# Patient Record
Sex: FEMALE | Race: WHITE | NOT HISPANIC OR LATINO | Employment: OTHER | URBAN - METROPOLITAN AREA
[De-identification: names, ages, dates, MRNs, and addresses within clinical notes are randomized per-mention and may not be internally consistent; named-entity substitution may affect disease eponyms.]

---

## 2017-01-04 ENCOUNTER — TELEPHONE (OUTPATIENT)
Dept: RADIOLOGY | Facility: HOSPITAL | Age: 72
End: 2017-01-04

## 2017-01-04 ENCOUNTER — HOSPITAL ENCOUNTER (OUTPATIENT)
Dept: RADIOLOGY | Facility: HOSPITAL | Age: 72
Discharge: HOME | End: 2017-01-04
Attending: INTERNAL MEDICINE
Payer: MEDICARE

## 2017-01-04 DIAGNOSIS — G89.29 CHRONIC PAIN OF RIGHT KNEE: ICD-10-CM

## 2017-01-04 DIAGNOSIS — M25.561 CHRONIC PAIN OF RIGHT KNEE: ICD-10-CM

## 2017-01-04 PROCEDURE — 73721 MRI JNT OF LWR EXTRE W/O DYE: CPT | Mod: TC,PO,RT

## 2017-01-04 PROCEDURE — 73721 MRI JNT OF LWR EXTRE W/O DYE: CPT | Mod: 26,RT,, | Performed by: RADIOLOGY

## 2018-07-18 ENCOUNTER — HOSPITAL ENCOUNTER (OUTPATIENT)
Dept: RADIOLOGY | Facility: HOSPITAL | Age: 73
Discharge: HOME | End: 2018-07-18
Attending: INTERNAL MEDICINE
Payer: MEDICARE

## 2018-07-18 DIAGNOSIS — R29.898 WEAKNESS OF BOTH LOWER EXTREMITIES: ICD-10-CM

## 2018-07-18 PROCEDURE — 72148 MRI LUMBAR SPINE W/O DYE: CPT | Mod: TC,PO

## 2018-07-18 PROCEDURE — 72148 MRI LUMBAR SPINE W/O DYE: CPT | Mod: 26,,, | Performed by: RADIOLOGY

## 2019-10-03 ENCOUNTER — HOSPITAL ENCOUNTER (OUTPATIENT)
Dept: RADIOLOGY | Facility: HOSPITAL | Age: 74
Discharge: HOME | End: 2019-10-03
Attending: INTERNAL MEDICINE
Payer: MEDICARE

## 2019-10-03 DIAGNOSIS — Z87.891 PERSONAL HISTORY OF TOBACCO USE, PRESENTING HAZARDS TO HEALTH: ICD-10-CM

## 2019-10-03 PROCEDURE — G0297 LDCT FOR LUNG CA SCREEN: HCPCS | Mod: TC,PO

## 2019-10-03 PROCEDURE — G0297 CT CHEST LUNG SCREENING LOW DOSE: ICD-10-PCS | Mod: 26,,, | Performed by: RADIOLOGY

## 2019-10-03 PROCEDURE — G0297 LDCT FOR LUNG CA SCREEN: HCPCS | Mod: 26,,, | Performed by: RADIOLOGY

## 2021-08-16 ENCOUNTER — HOSPITAL ENCOUNTER (OUTPATIENT)
Dept: RADIOLOGY | Facility: HOSPITAL | Age: 76
Discharge: HOME | End: 2021-08-16
Attending: INTERNAL MEDICINE
Payer: MEDICARE

## 2021-08-16 VITALS — BODY MASS INDEX: 30.12 KG/M2 | HEIGHT: 63 IN | WEIGHT: 170 LBS

## 2021-08-16 DIAGNOSIS — Z12.31 ENCOUNTER FOR SCREENING MAMMOGRAM FOR MALIGNANT NEOPLASM OF BREAST: ICD-10-CM

## 2021-08-16 PROCEDURE — 77063 BREAST TOMOSYNTHESIS BI: CPT | Mod: 26,,, | Performed by: RADIOLOGY

## 2021-08-16 PROCEDURE — 77067 SCR MAMMO BI INCL CAD: CPT | Mod: 26,,, | Performed by: RADIOLOGY

## 2021-08-16 PROCEDURE — 77063 MAMMO DIGITAL SCREENING BILAT WITH TOMO: ICD-10-PCS | Mod: 26,,, | Performed by: RADIOLOGY

## 2021-08-16 PROCEDURE — 77067 SCR MAMMO BI INCL CAD: CPT | Mod: TC,PO

## 2021-08-16 PROCEDURE — 77067 MAMMO DIGITAL SCREENING BILAT WITH TOMO: ICD-10-PCS | Mod: 26,,, | Performed by: RADIOLOGY

## 2022-06-07 ENCOUNTER — HOSPITAL ENCOUNTER (EMERGENCY)
Facility: HOSPITAL | Age: 77
Discharge: HOME | End: 2022-06-07
Attending: EMERGENCY MEDICINE
Payer: MEDICARE

## 2022-06-07 VITALS
BODY MASS INDEX: 25.75 KG/M2 | TEMPERATURE: 98 F | HEART RATE: 75 BPM | DIASTOLIC BLOOD PRESSURE: 79 MMHG | RESPIRATION RATE: 16 BRPM | SYSTOLIC BLOOD PRESSURE: 143 MMHG | HEIGHT: 66 IN | OXYGEN SATURATION: 96 % | WEIGHT: 160.25 LBS

## 2022-06-07 DIAGNOSIS — M54.30 SCIATICA, UNSPECIFIED LATERALITY: Primary | ICD-10-CM

## 2022-06-07 PROCEDURE — 99283 EMERGENCY DEPT VISIT LOW MDM: CPT | Mod: ER

## 2022-06-07 RX ORDER — PREDNISONE 50 MG/1
50 TABLET ORAL DAILY
Qty: 5 TABLET | Refills: 0 | Status: SHIPPED | OUTPATIENT
Start: 2022-06-07 | End: 2022-06-12

## 2022-06-07 NOTE — ED PROVIDER NOTES
"Encounter Date: 6/7/2022       History     Chief Complaint   Patient presents with    Hip Pain     R hip pain radiating down R leg for 3 days. Denies any recent injury      77-year-old female with complaint of right-sided buttock pain with radiation right leg for the past 2-3 days.  Patient reports pain began after she was pulling weeds on her knees in her front yard.  Patient denies fall or trauma.  Patient denies loss of bowel or bladder function.  No fever chills.        Review of patient's allergies indicates:   Allergen Reactions    Novocain [procaine] Anaphylaxis    Penicillins Other (See Comments)     "blacked out"    Bactrim [sulfamethoxazole-trimethoprim]      History reviewed. No pertinent past medical history.  Past Surgical History:   Procedure Laterality Date    TOTAL HIP ARTHROPLASTY      right    TUBAL LIGATION       Family History   Problem Relation Age of Onset    Breast cancer Neg Hx     Colon cancer Neg Hx     Ovarian cancer Neg Hx     Thrombophilia Neg Hx      Social History     Tobacco Use    Smoking status: Former Smoker   Substance Use Topics    Alcohol use: No    Drug use: No     Review of Systems   Constitutional: Negative for fever.   HENT: Negative for sore throat.    Respiratory: Negative for shortness of breath.    Cardiovascular: Negative for chest pain.   Gastrointestinal: Negative for nausea.   Genitourinary: Negative for dysuria.   Musculoskeletal: Positive for back pain.   Skin: Negative for rash.   Neurological: Negative for weakness.   Hematological: Does not bruise/bleed easily.       Physical Exam     Initial Vitals [06/07/22 1609]   BP Pulse Resp Temp SpO2   (!) 143/79 75 16 97.8 °F (36.6 °C) 96 %      MAP       --         Physical Exam    Nursing note and vitals reviewed.  Constitutional: She appears well-developed and well-nourished.   HENT:   Head: Normocephalic and atraumatic.   Eyes: Conjunctivae and EOM are normal. Pupils are equal, round, and reactive to " light.   Neck: Neck supple.   Normal range of motion.  Cardiovascular: Normal rate, regular rhythm, normal heart sounds and intact distal pulses.   Pulmonary/Chest: Breath sounds normal.   Abdominal: Abdomen is soft. There is no abdominal tenderness. There is no rebound and no guarding.   Musculoskeletal:         General: Normal range of motion.      Cervical back: Normal range of motion and neck supple.      Comments: No spine tenderness, pain in right lower back and leg with right lateral bending of spine, able to walk on heels and toes, straight leg negative     Neurological: She is alert and oriented to person, place, and time. She has normal strength and normal reflexes.   Skin: Skin is warm and dry.   Psychiatric: She has a normal mood and affect. Her behavior is normal. Thought content normal.         ED Course   Procedures  Labs Reviewed - No data to display       Imaging Results    None          Medications - No data to display                       Clinical Impression:   Final diagnoses:  [M54.30] Sciatica, unspecified laterality (Primary)          ED Disposition Condition    Discharge Stable        ED Prescriptions     Medication Sig Dispense Start Date End Date Auth. Provider    predniSONE (DELTASONE) 50 MG Tab Take 1 tablet (50 mg total) by mouth once daily. for 5 days 5 tablet 6/7/2022 6/12/2022 Norbert Hare NP        Follow-up Information     Follow up With Specialties Details Why Contact Info    Jose May MD Internal Medicine Schedule an appointment as soon as possible for a visit  As needed [id number] [facility] [address]  [phone]             Norbert Hare NP  06/07/22 1708

## 2022-08-17 ENCOUNTER — HOSPITAL ENCOUNTER (OUTPATIENT)
Dept: RADIOLOGY | Facility: HOSPITAL | Age: 77
Discharge: HOME | End: 2022-08-17
Attending: INTERNAL MEDICINE
Payer: MEDICARE

## 2022-08-17 VITALS — BODY MASS INDEX: 25.71 KG/M2 | HEIGHT: 66 IN | WEIGHT: 160 LBS

## 2022-08-17 DIAGNOSIS — Z12.31 ENCOUNTER FOR SCREENING MAMMOGRAM FOR MALIGNANT NEOPLASM OF BREAST: ICD-10-CM

## 2022-08-17 PROCEDURE — 77067 SCR MAMMO BI INCL CAD: CPT | Mod: 26,,, | Performed by: RADIOLOGY

## 2022-08-17 PROCEDURE — 77063 BREAST TOMOSYNTHESIS BI: CPT | Mod: 26,,, | Performed by: RADIOLOGY

## 2022-08-17 PROCEDURE — 77063 BREAST TOMOSYNTHESIS BI: CPT | Mod: TC,PO

## 2022-08-17 PROCEDURE — 77063 MAMMO DIGITAL SCREENING BILAT WITH TOMO: ICD-10-PCS | Mod: 26,,, | Performed by: RADIOLOGY

## 2022-08-17 PROCEDURE — 77067 MAMMO DIGITAL SCREENING BILAT WITH TOMO: ICD-10-PCS | Mod: 26,,, | Performed by: RADIOLOGY

## 2022-08-17 PROCEDURE — 77067 SCR MAMMO BI INCL CAD: CPT | Mod: TC,PO

## 2023-05-16 ENCOUNTER — LAB VISIT (OUTPATIENT)
Dept: LAB | Facility: HOSPITAL | Age: 78
End: 2023-05-16
Attending: PODIATRIST
Payer: MEDICARE

## 2023-05-16 DIAGNOSIS — B35.1 ONYCHOMYCOSIS: ICD-10-CM

## 2023-05-16 LAB
ALBUMIN SERPL BCP-MCNC: 3.9 G/DL (ref 3.5–5.2)
ALP SERPL-CCNC: 102 U/L (ref 55–135)
ALT SERPL W/O P-5'-P-CCNC: 16 U/L (ref 10–44)
AST SERPL-CCNC: 10 U/L (ref 10–40)
BILIRUB DIRECT SERPL-MCNC: 0.1 MG/DL (ref 0.1–0.3)
BILIRUB SERPL-MCNC: 0.3 MG/DL (ref 0.1–1)
PROT SERPL-MCNC: 7.1 G/DL (ref 6–8.4)

## 2023-05-16 PROCEDURE — 80076 HEPATIC FUNCTION PANEL: CPT | Mod: PO | Performed by: PODIATRIST

## 2023-05-16 PROCEDURE — 36415 COLL VENOUS BLD VENIPUNCTURE: CPT | Mod: PO | Performed by: PODIATRIST

## 2023-09-05 DIAGNOSIS — Z12.31 ENCOUNTER FOR SCREENING MAMMOGRAM FOR BREAST CANCER: Primary | ICD-10-CM

## 2023-09-06 ENCOUNTER — HOSPITAL ENCOUNTER (OUTPATIENT)
Dept: RADIOLOGY | Facility: HOSPITAL | Age: 78
Discharge: HOME | End: 2023-09-06
Attending: NURSE PRACTITIONER
Payer: MEDICARE

## 2023-09-06 VITALS — WEIGHT: 160 LBS | BODY MASS INDEX: 25.71 KG/M2 | HEIGHT: 66 IN

## 2023-09-06 DIAGNOSIS — Z12.31 ENCOUNTER FOR SCREENING MAMMOGRAM FOR BREAST CANCER: ICD-10-CM

## 2023-09-06 PROCEDURE — 77067 SCR MAMMO BI INCL CAD: CPT | Mod: TC,PO

## 2023-09-06 PROCEDURE — 77067 SCR MAMMO BI INCL CAD: CPT | Mod: 26,,, | Performed by: RADIOLOGY

## 2023-09-06 PROCEDURE — 77063 BREAST TOMOSYNTHESIS BI: CPT | Mod: 26,,, | Performed by: RADIOLOGY

## 2023-09-06 PROCEDURE — 77067 MAMMO DIGITAL SCREENING BILAT WITH TOMO: ICD-10-PCS | Mod: 26,,, | Performed by: RADIOLOGY

## 2023-09-06 PROCEDURE — 77063 MAMMO DIGITAL SCREENING BILAT WITH TOMO: ICD-10-PCS | Mod: 26,,, | Performed by: RADIOLOGY

## 2024-01-10 ENCOUNTER — LAB VISIT (OUTPATIENT)
Dept: LAB | Facility: HOSPITAL | Age: 79
End: 2024-01-10
Attending: PODIATRIST
Payer: MEDICARE

## 2024-01-10 DIAGNOSIS — B35.1 ONYCHOMYCOSIS: ICD-10-CM

## 2024-01-10 LAB
ALBUMIN SERPL BCP-MCNC: 3.8 G/DL (ref 3.5–5.2)
ALP SERPL-CCNC: 114 U/L (ref 55–135)
ALT SERPL W/O P-5'-P-CCNC: 17 U/L (ref 10–44)
AST SERPL-CCNC: 14 U/L (ref 10–40)
BILIRUB DIRECT SERPL-MCNC: 0.1 MG/DL (ref 0.1–0.3)
BILIRUB SERPL-MCNC: 0.2 MG/DL (ref 0.1–1)
PROT SERPL-MCNC: 7.4 G/DL (ref 6–8.4)

## 2024-01-10 PROCEDURE — 80076 HEPATIC FUNCTION PANEL: CPT | Mod: PO | Performed by: PODIATRIST

## 2024-01-10 PROCEDURE — 36415 COLL VENOUS BLD VENIPUNCTURE: CPT | Mod: PO | Performed by: PODIATRIST

## 2024-01-18 ENCOUNTER — HOSPITAL ENCOUNTER (EMERGENCY)
Facility: HOSPITAL | Age: 79
Discharge: HOME | End: 2024-01-18
Attending: EMERGENCY MEDICINE
Payer: MEDICARE

## 2024-01-18 VITALS
WEIGHT: 157.63 LBS | HEART RATE: 76 BPM | OXYGEN SATURATION: 96 % | HEIGHT: 66 IN | BODY MASS INDEX: 25.33 KG/M2 | RESPIRATION RATE: 18 BRPM | DIASTOLIC BLOOD PRESSURE: 65 MMHG | TEMPERATURE: 98 F | SYSTOLIC BLOOD PRESSURE: 136 MMHG

## 2024-01-18 DIAGNOSIS — J06.9 VIRAL UPPER RESPIRATORY TRACT INFECTION WITH COUGH: Primary | ICD-10-CM

## 2024-01-18 LAB
CTP QC/QA: YES
CTP QC/QA: YES
POC MOLECULAR INFLUENZA A AGN: NEGATIVE
POC MOLECULAR INFLUENZA B AGN: NEGATIVE
SARS-COV-2 RDRP RESP QL NAA+PROBE: NEGATIVE

## 2024-01-18 PROCEDURE — 99282 EMERGENCY DEPT VISIT SF MDM: CPT | Mod: ER

## 2024-01-18 PROCEDURE — 87502 INFLUENZA DNA AMP PROBE: CPT | Mod: ER

## 2024-01-18 PROCEDURE — 87635 SARS-COV-2 COVID-19 AMP PRB: CPT | Mod: ER | Performed by: EMERGENCY MEDICINE

## 2024-01-18 NOTE — ED PROVIDER NOTES
"Encounter Date: 1/18/2024       History     Chief Complaint   Patient presents with    COVID-19 Concerns     Requesting Covid test due to cough, onset yesterday     Asking for a COVID test, minor cough since yesterday with slight hoarseness, no other symptoms.  Had COVID once a year ago, not hospitalized, denies having had any doses of vaccine.  No other symptoms presently, denies fever, dyspnea, vomiting, diarrhea, chest pain, or other complaints.  No other concerns.  Testing negative for flu and COVID on arrival, recommended routine over-the-counter cough medicine, she does not want any further evaluation today and has no other complaints.    The history is provided by the patient. No  was used.     Review of patient's allergies indicates:   Allergen Reactions    Novocain [procaine] Anaphylaxis    Penicillins Other (See Comments)     "blacked out"    Bactrim [sulfamethoxazole-trimethoprim]      History reviewed. No pertinent past medical history.  Past Surgical History:   Procedure Laterality Date    TOTAL HIP ARTHROPLASTY      right    TUBAL LIGATION       Family History   Problem Relation Age of Onset    Breast cancer Neg Hx     Colon cancer Neg Hx     Ovarian cancer Neg Hx     Thrombophilia Neg Hx      Social History     Tobacco Use    Smoking status: Former     Types: Cigarettes    Smokeless tobacco: Current   Substance Use Topics    Alcohol use: No    Drug use: No     Review of Systems   Constitutional:  Negative for activity change, fatigue and fever.   HENT:  Negative for congestion, ear pain, facial swelling, nosebleeds, sinus pressure and sore throat.    Eyes:  Negative for pain, discharge, redness and visual disturbance.   Respiratory:  Positive for cough. Negative for choking, chest tightness, shortness of breath and wheezing.    Cardiovascular:  Negative for chest pain, palpitations and leg swelling.   Gastrointestinal:  Negative for abdominal distention, abdominal pain, nausea and " vomiting.   Endocrine: Negative for heat intolerance, polydipsia and polyuria.   Genitourinary:  Negative for difficulty urinating, dysuria, flank pain, hematuria and urgency.   Musculoskeletal:  Negative for back pain, gait problem, joint swelling and myalgias.   Skin:  Negative for color change and rash.   Allergic/Immunologic: Negative for environmental allergies and food allergies.   Neurological:  Negative for dizziness, weakness, numbness and headaches.   Hematological:  Negative for adenopathy. Does not bruise/bleed easily.   Psychiatric/Behavioral:  Negative for agitation and behavioral problems. The patient is not nervous/anxious.    All other systems reviewed and are negative.      Physical Exam     Initial Vitals [01/18/24 0741]   BP Pulse Resp Temp SpO2   136/65 76 18 98.1 °F (36.7 °C) 96 %      MAP       --         Physical Exam    Nursing note and vitals reviewed.  Constitutional: She appears well-developed and well-nourished. She is not diaphoretic. No distress.   HENT:   Head: Normocephalic and atraumatic.   Mouth/Throat: No oropharyngeal exudate.   Eyes: Conjunctivae and EOM are normal. Pupils are equal, round, and reactive to light. Right eye exhibits no discharge. Left eye exhibits no discharge. No scleral icterus.   Neck: Neck supple. No thyromegaly present. No tracheal deviation present. No JVD present.   Normal range of motion.  Cardiovascular:  Normal rate, regular rhythm, normal heart sounds and intact distal pulses.     Exam reveals no gallop and no friction rub.       No murmur heard.  Pulmonary/Chest: Breath sounds normal. No stridor. No respiratory distress. She has no wheezes. She has no rhonchi. She has no rales. She exhibits no tenderness.   Abdominal: Abdomen is soft. Bowel sounds are normal. She exhibits no distension and no mass. There is no abdominal tenderness. There is no rebound and no guarding.   Musculoskeletal:         General: No tenderness or edema. Normal range of motion.       Cervical back: Normal range of motion and neck supple.     Neurological: She is alert and oriented to person, place, and time. She has normal strength.   Skin: Skin is warm and dry. No rash and no abscess noted. No erythema.   Psychiatric: She has a normal mood and affect. Her behavior is normal. Judgment and thought content normal.         ED Course   Procedures  Labs Reviewed   SARS-COV-2 RDRP GENE   POCT INFLUENZA A/B MOLECULAR          Imaging Results    None          Medications - No data to display  Medical Decision Making  Problems Addressed:  Viral upper respiratory tract infection with cough: acute illness or injury    Amount and/or Complexity of Data Reviewed  Labs: ordered. Decision-making details documented in ED Course.    Risk  OTC drugs.      Additional MDM:   Differential Diagnosis:   COVID/ flu/ other viral URI                                    Clinical Impression:  Final diagnoses:  [J06.9] Viral upper respiratory tract infection with cough (Primary)          ED Disposition Condition    Discharge Stable          ED Prescriptions    None       Follow-up Information       Follow up With Specialties Details Why Contact Info    [facility] Emergency Dept Emergency Medicine  As needed [address] [address] [address] [phone]    Ming Hyman Jr., DPM Podiatry  As needed [address]  [address]  [facility]  [facility] [id number] [phone]               Giovani Ruano MD  01/18/24 6239

## 2024-02-09 ENCOUNTER — HOSPITAL ENCOUNTER (EMERGENCY)
Facility: HOSPITAL | Age: 79
Discharge: HOME | End: 2024-02-09
Attending: EMERGENCY MEDICINE
Payer: MEDICARE

## 2024-02-09 VITALS
WEIGHT: 157.88 LBS | RESPIRATION RATE: 18 BRPM | DIASTOLIC BLOOD PRESSURE: 67 MMHG | OXYGEN SATURATION: 96 % | TEMPERATURE: 98 F | SYSTOLIC BLOOD PRESSURE: 154 MMHG | HEART RATE: 74 BPM | BODY MASS INDEX: 25.48 KG/M2

## 2024-02-09 DIAGNOSIS — M25.552 BILATERAL HIP PAIN: ICD-10-CM

## 2024-02-09 DIAGNOSIS — M25.551 BILATERAL HIP PAIN: ICD-10-CM
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
## 2024-02-09 PROCEDURE — 99283 EMERGENCY DEPT VISIT LOW MDM: CPT | Mod: 25,ER

## 2024-02-09 PROCEDURE — 25000003 PHARM REV CODE 250: Mod: ER | Performed by: NURSE PRACTITIONER

## 2024-02-09 RX ORDER — MUPIROCIN 20 MG/G
OINTMENT TOPICAL
Status: COMPLETED | OUTPATIENT
Start: 2024-02-09 | End: 2024-02-09

## 2024-02-09 RX ADMIN — MUPIROCIN: 20 OINTMENT TOPICAL at 01:02

## 2024-02-09 NOTE — ED PROVIDER NOTES
"Encounter Date: 2/9/2024       History     Chief Complaint   Patient presents with    Fall     Fell on Wednesday. ground level fall. Pain in hips. Bruising in right hip. Hx of hip replacements.      The complaints are hip pain after a fall which occurred 2 days ago. She has a wound on her right hip to which she has applied neosporin and gauze. No difficulty walking but concerned about her hip replacements.         Review of patient's allergies indicates:   Allergen Reactions    Novocain [procaine] Anaphylaxis    Penicillins Other (See Comments)     "blacked out"    Bactrim [sulfamethoxazole-trimethoprim]      No past medical history on file.  Past Surgical History:   Procedure Laterality Date    TOTAL HIP ARTHROPLASTY      right    TUBAL LIGATION       Family History   Problem Relation Age of Onset    Breast cancer Neg Hx     Colon cancer Neg Hx     Ovarian cancer Neg Hx     Thrombophilia Neg Hx      Social History     Tobacco Use    Smoking status: Former     Types: Cigarettes    Smokeless tobacco: Current   Substance Use Topics    Alcohol use: No    Drug use: No     Review of Systems   Constitutional:  Negative for chills and fever.   Respiratory:  Negative for shortness of breath.    Cardiovascular:  Negative for chest pain.   Gastrointestinal:  Negative for nausea and vomiting.   Musculoskeletal:  Negative for back pain, gait problem and joint swelling.   Skin:  Positive for wound.   Psychiatric/Behavioral:  Negative for agitation and behavioral problems.        Physical Exam     Initial Vitals [02/09/24 1248]   BP Pulse Resp Temp SpO2   (!) 154/67 74 18 98.1 °F (36.7 °C) 96 %      MAP       --         Physical Exam    Vitals reviewed.  Constitutional: She appears well-developed and well-nourished.   HENT:   Head: Normocephalic and atraumatic.   Eyes: Conjunctivae are normal.   Neck:   Normal range of motion.  Cardiovascular:  Normal rate and regular rhythm.           Pulmonary/Chest: Breath sounds normal. No " respiratory distress.   Abdominal: Abdomen is soft. Bowel sounds are normal.   Musculoskeletal:         General: Normal range of motion.      Cervical back: Normal range of motion.      Right hip: No deformity or bony tenderness. Normal range of motion.      Left hip: No deformity or bony tenderness. Normal range of motion.     Neurological: She is oriented to person, place, and time.   Skin:   Strawberry type wound covering right hip.    Psychiatric: She has a normal mood and affect. Thought content normal.         ED Course   Procedures  Labs Reviewed - No data to display       Imaging Results              X-Ray Hips Bilateral 2 View Inc AP Pelvis (Final result)  Result time 02/09/24 13:56:10   Procedure changed from X-Ray Hip 2 or 3 views Right (with Pelvis when performed)     Final result by Sav Reyna MD (02/09/24 13:56:10)                   Impression:      Patient is status post bilateral hip replacement.  No acute osseous abnormality is identified      Electronically signed by: Sav Reyna  Date:    02/09/2024  Time:    13:56               Narrative:    EXAMINATION:  XR HIPS BILATERAL 2 VIEW INCL AP PELVIS    CLINICAL HISTORY:  Unspecified fall, initial encounterpain;    TECHNIQUE:  AP pelvis with two views of both hips    COMPARISON:  None    FINDINGS:  The patient is status post bilateral hip replacement.  No acute fracture or dislocation is identified.  Arthritic changes of involving the visualized lower spine and SI joints.  There is a pessary ring in the pelvis.                                       Medications   mupirocin 2 % ointment ( Topical (Top) Given 2/9/24 2375)     Medical Decision Making  Amount and/or Complexity of Data Reviewed  Radiology: ordered.    Risk  Prescription drug management.                           The pt is resting comfortably and is NAD. . Discussed test results, shared treatment plan, specific conditions for return, and the need for f/u.  Answered their questions at this  time.  Pt understands and agrees to the plan.  The pt has remained hemodynamically stable through ED course and is stable for discharge.     I discussed with patient and/or family/caretaker that evaluation in the ED does not suggest any emergent or life threatening medical conditions requiring immediate intervention beyond what was provided in the ED, and I believe patient is safe for discharge.  Regardless, an unremarkable evaluation in the ED does not preclude the development or presence of a serious of life threatening condition. As such, patient was instructed to return immediately for any worsening or change in current symptoms.            Clinical Impression:  Final diagnoses:  [W19.XXXA] Fall  [W19.XXXA] Fall, initial encounter (Primary)  [M25.551, M25.552] Bilateral hip pain          ED Disposition Condition    Discharge Stable          ED Prescriptions    None       Follow-up Information       Follow up With Specialties Details Why Contact Info    Ming Hyman Jr., DPM Podiatry  As needed [address]  [address]  [facility]  [facility] [address]  [phone]               Nicki Ramachandran, P  02/09/24 5758

## 2024-05-16 ENCOUNTER — TELEPHONE (OUTPATIENT)
Dept: OBSTETRICS AND GYNECOLOGY | Facility: CLINIC | Age: 79
End: 2024-05-16
Payer: MEDICARE

## 2024-05-16 NOTE — TELEPHONE ENCOUNTER
----- Message from Tamia Taylor sent at 5/16/2024  1:00 PM CDT -----  Contact: Yaquelin Jamison is needing a call back regarding being scheduled in [facility] for a new patient appointment for vaginal bleeding. Please give her a call at [phone]

## 2024-05-16 NOTE — TELEPHONE ENCOUNTER
----- Message from Larisa Andrews sent at 5/16/2024  3:04 PM CDT -----  Contact: Yaquelin  Type:  Patient Returning Call    Who Called:Yaquelin  Who Left Message for Patient:unknown  Does the patient know what this is regarding?:Appointment scheduling  Would the patient rather a call back or a response via [facility]? call  Best Call Back Number:[phone]   Additional Information: Patient reports missing a call and request another call back to schedule for an appointment on Monday.   Thank you,  GH

## 2024-05-16 NOTE — TELEPHONE ENCOUNTER
Pt called in regards to vaginal bleeding, Pt wanted to be seen in [facility] only. Advised pt that we do not have GYN in [facility] at this time. Offered pt [facility]. Pt declined.       Olga MCMILLAN LPN  OB/GYN

## 2024-10-11 ENCOUNTER — TELEPHONE (OUTPATIENT)
Dept: OBSTETRICS AND GYNECOLOGY | Facility: CLINIC | Age: 79
End: 2024-10-11
Payer: MEDICARE

## 2024-10-11 DIAGNOSIS — Z12.31 ENCOUNTER FOR SCREENING MAMMOGRAM FOR BREAST CANCER: Primary | ICD-10-CM

## 2024-10-11 NOTE — TELEPHONE ENCOUNTER
Called to assist with scheduling mammogram, left message for patient to call office to schedule mammogram.

## 2024-10-11 NOTE — TELEPHONE ENCOUNTER
----- Message from Alda sent at 10/10/2024  4:15 PM CDT -----  Caller is requesting to schedule their annual mammogram appointment.  Order is not listed in EPIC.  Please enter order and contact patient to schedule.    Name of Caller:NORY ADKINS [[id number]]      Where would they like the mammogram performed? [facility]      Best Call Back Number:Telephone Information:  Mobile          [phone]      Additional Information:Please advise thank you

## 2024-10-16 ENCOUNTER — HOSPITAL ENCOUNTER (OUTPATIENT)
Dept: RADIOLOGY | Facility: HOSPITAL | Age: 79
Discharge: HOME | End: 2024-10-16
Attending: NURSE PRACTITIONER
Payer: MEDICARE

## 2024-10-16 VITALS — HEIGHT: 66 IN | WEIGHT: 153 LBS | BODY MASS INDEX: 24.59 KG/M2

## 2024-10-16 DIAGNOSIS — Z12.31 ENCOUNTER FOR SCREENING MAMMOGRAM FOR BREAST CANCER: ICD-10-CM

## 2024-10-16 PROCEDURE — 77067 SCR MAMMO BI INCL CAD: CPT | Mod: TC,PO

## 2024-10-16 PROCEDURE — 77067 SCR MAMMO BI INCL CAD: CPT | Mod: 26,,, | Performed by: STUDENT IN AN ORGANIZED HEALTH CARE EDUCATION/TRAINING PROGRAM

## 2024-10-16 PROCEDURE — 77063 BREAST TOMOSYNTHESIS BI: CPT | Mod: 26,,, | Performed by: STUDENT IN AN ORGANIZED HEALTH CARE EDUCATION/TRAINING PROGRAM

## 2024-10-18 ENCOUNTER — TELEPHONE (OUTPATIENT)
Dept: OBSTETRICS AND GYNECOLOGY | Facility: CLINIC | Age: 79
End: 2024-10-18
Payer: MEDICARE

## 2024-10-18 NOTE — TELEPHONE ENCOUNTER
----- Message from Sophy Baxter NP sent at 10/18/2024  9:32 AM CDT -----  Your mammogram has been interrupted as normal, recommend to repeat in one year.

## 2024-10-18 NOTE — TELEPHONE ENCOUNTER
----- Message from Ирина sent at 10/18/2024 12:15 PM CDT -----  Type:  Patient Returning Call    Who Called:Yaquelin Diaz    Who Left Message for Patient:Carissa   Does the patient know what this is regarding?:mammo  Would the patient rather a call back or a response via [facility]?    Best Call Back Number:[phone]    Additional Information: RTC

## 2025-03-04 DIAGNOSIS — M25.562 LEFT KNEE PAIN, UNSPECIFIED CHRONICITY: Primary | ICD-10-CM

## 2025-03-05 ENCOUNTER — HOSPITAL ENCOUNTER (OUTPATIENT)
Dept: RADIOLOGY | Facility: HOSPITAL | Age: 80
Discharge: HOME | End: 2025-03-05
Attending: ORTHOPAEDIC SURGERY
Payer: MEDICARE

## 2025-03-05 ENCOUNTER — OFFICE VISIT (OUTPATIENT)
Dept: ORTHOPEDICS | Facility: CLINIC | Age: 80
End: 2025-03-05
Payer: MEDICARE

## 2025-03-05 ENCOUNTER — TELEPHONE (OUTPATIENT)
Dept: ORTHOPEDICS | Facility: CLINIC | Age: 80
End: 2025-03-05
Payer: MEDICARE

## 2025-03-05 VITALS — HEIGHT: 64 IN | WEIGHT: 160.69 LBS | BODY MASS INDEX: 27.43 KG/M2

## 2025-03-05 DIAGNOSIS — Z96.643 STATUS POST HIP REPLACEMENT, BILATERAL: ICD-10-CM

## 2025-03-05 DIAGNOSIS — Z96.651 STATUS POST RIGHT KNEE REPLACEMENT: Primary | ICD-10-CM

## 2025-03-05 DIAGNOSIS — M17.12 PRIMARY OSTEOARTHRITIS OF LEFT KNEE: ICD-10-CM

## 2025-03-05 DIAGNOSIS — M25.562 LEFT KNEE PAIN, UNSPECIFIED CHRONICITY: ICD-10-CM

## 2025-03-05 DIAGNOSIS — M25.562 LEFT KNEE PAIN, UNSPECIFIED CHRONICITY: Primary | ICD-10-CM

## 2025-03-05 PROCEDURE — 99203 OFFICE O/P NEW LOW 30 MIN: CPT | Mod: S$PBB,,, | Performed by: ORTHOPAEDIC SURGERY

## 2025-03-05 PROCEDURE — 73562 X-RAY EXAM OF KNEE 3: CPT | Mod: 26,59,RT, | Performed by: RADIOLOGY

## 2025-03-05 PROCEDURE — 99999 PR PBB SHADOW E&M-EST. PATIENT-LVL III: CPT | Mod: PBBFAC,,, | Performed by: ORTHOPAEDIC SURGERY

## 2025-03-05 PROCEDURE — G2211 COMPLEX E/M VISIT ADD ON: HCPCS | Mod: S$PBB,,, | Performed by: ORTHOPAEDIC SURGERY

## 2025-03-05 PROCEDURE — 73564 X-RAY EXAM KNEE 4 OR MORE: CPT | Mod: 26,LT,, | Performed by: RADIOLOGY

## 2025-03-05 PROCEDURE — 99213 OFFICE O/P EST LOW 20 MIN: CPT | Mod: PBBFAC,25 | Performed by: ORTHOPAEDIC SURGERY

## 2025-03-05 PROCEDURE — 73562 X-RAY EXAM OF KNEE 3: CPT | Mod: TC,RT

## 2025-03-05 RX ORDER — SYRING-NEEDL,DISP,INSUL,0.3 ML 29 G X1/2"
296 SYRINGE, EMPTY DISPOSABLE MISCELLANEOUS ONCE
COMMUNITY

## 2025-03-05 RX ORDER — VITAMIN E 268 MG
1 CAPSULE ORAL EVERY MORNING
COMMUNITY

## 2025-03-05 RX ORDER — AMLODIPINE BESYLATE 10 MG/1
1 TABLET ORAL EVERY MORNING
COMMUNITY
Start: 2024-12-30 | End: 2025-12-30

## 2025-03-05 RX ORDER — VITAMIN E/VITAMINS A AND D
CREAM (GRAM) TOPICAL
COMMUNITY

## 2025-03-05 RX ORDER — ASCORBIC ACID 500 MG
1 TABLET ORAL EVERY MORNING
COMMUNITY

## 2025-03-05 RX ORDER — GARLIC 1000 MG
CAPSULE ORAL
COMMUNITY

## 2025-03-05 RX ORDER — FOLIC ACID 0.4 MG
1 TABLET ORAL EVERY MORNING
COMMUNITY

## 2025-03-05 RX ORDER — MELOXICAM 15 MG/1
1 TABLET ORAL DAILY
COMMUNITY
Start: 2024-03-20

## 2025-03-05 NOTE — TELEPHONE ENCOUNTER
Patient called has been returned regarding gel injection. She would like to come on \03/19/25 at 9:45

## 2025-03-05 NOTE — PATIENT INSTRUCTIONS
Encounter Diagnoses   Name Primary?    Status post right knee replacement Yes    Primary osteoarthritis of left knee     Status post hip replacement, bilateral        ASSESSMENT:  Kellgren Marquez grade 4 arthritis of the left knee.  Right total knee arthroplasty 5+ years ago per Dr. Kenny  Bilateral total hip arthroplasties by Dr. Kenny    TREATMENT/PLAN:  Prescription for a rolling walker with seat.  Handicap parking application permanent disability.  Left knee viscosupplementation, single injection product.  Pre cure precertification and return to office in 2 weeks for injectional treatment

## 2025-03-05 NOTE — PROGRESS NOTES
Paco Darling MD  Orthopedic Surgeon   [address] [place] [address]                 Name: Yaquelin Diaz  MRN: [id number]  Date: 3/5/2025    Patient ID: Yaquelin Diaz is a 79 y.o. female from [place]    Reason for visit:  Evaluation of left knee pain, the patient wants to get viscosupplementation for her left knee arthritis    Chief Complaint: Pain of the Left Knee      Patient Instructions     Encounter Diagnoses   Name Primary?    Status post right knee replacement Yes    Primary osteoarthritis of left knee     Status post hip replacement, bilateral        ASSESSMENT:  Kellgren Marquez grade 4 arthritis of the left knee.  Right total knee arthroplasty 5+ years ago per Dr. Kenny  Bilateral total hip arthroplasties by Dr. Kenny    TREATMENT/PLAN:  Prescription for a rolling walker with seat.  Handicap parking application permanent disability.  Left knee viscosupplementation, single injection product.  Pre cure precertification and return to office in 2 weeks for injectional treatment    HISTORY OF PRESENT ILLNESS:   Yaquelin Diaz is a 79 y.o. female.Patient presents today on referral from her friend and my patient Mrs. Gutierrez  She has not 8/10 pain in her left knee due to osteoarthritis.  Recently had a Visco supplement injection in June or July of 2024 that helped tremendously dropping her pain down to 3/10.  The Visco supplement injection was performed at the [facility] by Dr. Kenny.    She has bilateral total hip replacements and a right total knee replacement all performed by Dr. Kenny.  These are doing well.    She is establishing orthopedic care with me at this time, her previous orthopedic surgeon Dr. Kenny has retired.    Objective     XRAY:  03/05/2025 x-rays at  "[facility] today  Kellgren Marquez grade 4 arthritis of the left knee bone-on-bone with a varus deformity.  Right knee replacement with a rotating platform implant and patellar resurfacing.  Good alignment and positioning of the right total knee.    PHYSICAL EXAMINATION: Body mass index is 28.02 kg/m².    GENERAL:  Pleasant cooperative alert  female in no acute distress well dressed and well groomed.    EXTREMITY:  Left knee range of motion near 0° to 120+ degrees of flexion.  Skin intact around the left knee.  Positive crepitation medial joint line.  No popliteal cyst.  Calf soft.  Neurovascularly intact.  Normal pulses and cap refill.  Collateral ligaments stable.  ACL PCL stable.  Left hip and ankle range of motion are normal without pain.    Right total knee has a well-healed anterior incision flexing 0 to 120+ with good stability in extension, mid flexion and deep flexion.  Central patellofemoral tracking on the right knee without significant clicking popping or grinding.  Calf soft in the right lower extremity normal pulses and capillary refill.    Bilateral hips have good circumduction range of motion without significant pain.         MEDICATIONS: Current Medications[1]    ALLERGIES:   Review of patient's allergies indicates:   Allergen Reactions    Novocain [procaine] Anaphylaxis    Penicillins Other (See Comments)     "blacked out"    Bactrim [sulfamethoxazole-trimethoprim]        MEDICAL HISTORY:   Past Medical History:   Diagnosis Date    Arthritis     Hypertension        SURGICAL HISTORY:   Past Surgical History:   Procedure Laterality Date    KNEE SURGERY Right     TOTAL HIP ARTHROPLASTY Bilateral     right    TUBAL LIGATION         REVIEW OF SYSTEMS:   No fevers, chills, sweats, chest pain or shortness of breath.  Positive for back pain and joint pain.  SOCIAL HISTORY:   Social History     Occupational History    Not on file   Tobacco Use    Smoking status: Former     Types: Cigarettes    " Smokeless tobacco: Never   Substance and Sexual Activity    Alcohol use: No    Drug use: No    Sexual activity: Not Currently          35 minute patient encounter  This includes face to face time and non-face to face time preparing to see the patient (eg, review of tests),   obtaining and/or reviewing separately obtained history, documenting clinical information in the electronic or other health record, independently interpreting results   and communicating results to the patient/family/caregiver, or care coordinator.       This patient has a chronic osteoarthritis diagnosis/condition.  Today's visit is associated with current or anticipated ongoing medical care related to this patient's diagnosis of osteoarthritis.   Currently there is no cure of osteoarthritis and the patient will require regular follow up to manage symptoms and progression.  I, Paco Darling M.D., will be the primary medical provider for ongoing episodic treatment every 3-6 months as needed.  As such, CPT code  is the appropriate add-on code to accompany the other E/M billing for this visit.       DISCLAIMER: This note was prepared with Kamel[employer] voice recognition transcription software. Garbled syntax, mangled pronouns, and other bizarre constructions may be attributed to that software system.      Paco Darling M.D.  Orthopedic Surgeon  [facility]           [1]   Current Outpatient Medications:     aloe vera 99.5 % Gel, Apply topically., Disp: , Rfl:     amLODIPine (NORVASC) 10 MG tablet, Take 1 tablet by mouth every morning., Disp: , Rfl:     ascorbic acid, vitamin C, (VITAMIN C) 500 MG tablet, Take 1 tablet by mouth every morning., Disp: , Rfl:     calcium carbonate 1250 MG capsule, Take 1,250 mg by mouth 2 (two) times daily with meals., Disp: , Rfl:     cyanocobalamin, vitamin B-12, (VITAMIN B-12) 50 mcg tablet, Take 50 mcg by mouth once daily., Disp: , Rfl:     D5W SolP 100 mL with folic acid 5 mg/mL Soln, Inject into  the vein once., Disp: , Rfl:     folic acid (FOLVITE) 400 MCG tablet, Take 1 tablet by mouth every morning., Disp: , Rfl:     garlic 1,000 mg Cap, Take by mouth., Disp: , Rfl:     magnesium citrate solution, Take 296 mLs by mouth once., Disp: , Rfl:     meloxicam (MOBIC) 15 MG tablet, Take 1 tablet by mouth once daily., Disp: , Rfl:     multivitamin with minerals tablet, Take 1 tablet by mouth once daily., Disp: , Rfl:     potassium bicarbonate (K-LYTE) disintegrating tablet, Take by mouth 2 (two) times daily., Disp: , Rfl:     VITAMIN B COMPLEX ORAL, Take 1 tablet by mouth every morning., Disp: , Rfl:     vitamin E 400 UNIT capsule, Take 1 capsule by mouth every morning., Disp: , Rfl:     conjugated estrogens (PREMARIN) vaginal cream, Place vaginally once daily. Place 1 gm vaginally 2-3 times a week x 2 months, Disp: 30 g, Rfl: 3

## 2025-03-05 NOTE — TELEPHONE ENCOUNTER
----- Message from Deanne sent at 3/5/2025  1:29 PM CST -----  Contact: NORY ADKINS [[id number]]  ..Type:  Patient Requesting CallWho Called: NORY ADKINS [[id number]]Does the patient know what this is regarding?: scheduling injectionWould the patient rather a call back or a response via [facility]?  callBest Call Back Number: .[phone] (home) Additional Information:

## 2025-03-05 NOTE — TELEPHONE ENCOUNTER
----- Message from Deanne sent at 3/5/2025  1:29 PM CST -----  Contact: NORY ADKINS [[id number]]  ..Type:  Patient Requesting CallWho Called: NORY ADKINS [[id number]]Does the patient know what this is regarding?: scheduling injectionWould the patient rather a call back or a response via [facility]?  callBest Call Back Number: .[phone] (home) Additional Information:

## 2025-03-06 ENCOUNTER — TELEPHONE (OUTPATIENT)
Dept: ORTHOPEDICS | Facility: CLINIC | Age: 80
End: 2025-03-06
Payer: MEDICARE

## 2025-03-06 NOTE — TELEPHONE ENCOUNTER
Spoke with pt, advised pt there are no sooner available appts, pt opted to keep original appt call ended well.

## 2025-03-06 NOTE — TELEPHONE ENCOUNTER
----- Message from [employer] sent at 3/6/2025  3:00 PM CST -----  Contact: patient  Type:  Patient Returning CallWho Called:Yaquelin Diaz Who Left Message for Patient: nurseDoes the patient know what this is regarding?: her upcoming appt Would the patient rather a call back or a response via [facility]?  Call Best Call Back Number:[phone] Information:

## 2025-03-06 NOTE — TELEPHONE ENCOUNTER
----- Message from Nurse Benedict sent at 3/6/2025 12:03 PM CST -----  Contact: Patient    ----- Message -----  From: Alan Morgan  Sent: 3/6/2025  12:02 PM CST  To: Pope Paco Staff    Type:  Needs Medical AdviceWho Called: Yaquelin Vailould the patient rather a call back or a response via [facility]? Call [facility] Call Back Number:  [phone] Additional Information:  pt is requesting a call back from Maria Antonia regarding her upcoming appt.

## 2025-03-06 NOTE — TELEPHONE ENCOUNTER
----- Message from Alan sent at 3/6/2025 12:01 PM CST -----  Contact: Patient  Type:  Needs Medical AdviceWho Called: Yaquelin Vailould the patient rather a call back or a response via [facility]? Call [facility] Call Back Number:  [phone] Additional Information:  pt is requesting a call back from Maria Antonia regarding her upcoming appt.

## 2025-03-06 NOTE — TELEPHONE ENCOUNTER
----- Message from Wingz sent at 3/5/2025  3:56 PM CST -----  Contact: self  .Type:  Sooner Apoointment RequestCaller is requesting a sooner appointment.  Caller declined first available appointment listed below.  Caller will not accept being placed on the waitlist and is requesting a message be sent to doctor.Name of Caller:.Yaquelin Braswell the patient rather a call back or a response via [facility]? Call [facility] Call Back Number:.[phone] Information: Pt is calling to get an injection scheduled. Also wants to know if she left a red purse/wallet in office today.

## 2025-03-19 ENCOUNTER — OFFICE VISIT (OUTPATIENT)
Dept: ORTHOPEDICS | Facility: CLINIC | Age: 80
End: 2025-03-19
Payer: MEDICARE

## 2025-03-19 ENCOUNTER — TELEPHONE (OUTPATIENT)
Dept: ORTHOPEDICS | Facility: CLINIC | Age: 80
End: 2025-03-19

## 2025-03-19 VITALS — HEIGHT: 64 IN | BODY MASS INDEX: 27.43 KG/M2 | WEIGHT: 160.69 LBS

## 2025-03-19 DIAGNOSIS — M17.12 PRIMARY OSTEOARTHRITIS OF LEFT KNEE: Primary | ICD-10-CM

## 2025-03-19 PROCEDURE — 99213 OFFICE O/P EST LOW 20 MIN: CPT | Mod: PBBFAC | Performed by: ORTHOPAEDIC SURGERY

## 2025-03-19 PROCEDURE — 20610 DRAIN/INJ JOINT/BURSA W/O US: CPT | Mod: PBBFAC,LT | Performed by: ORTHOPAEDIC SURGERY

## 2025-03-19 PROCEDURE — 99999PBSHW PR PBB SHADOW TECHNICAL ONLY FILED TO HB: Mod: JZ,PBBFAC,,

## 2025-03-19 PROCEDURE — 99999 PR PBB SHADOW E&M-EST. PATIENT-LVL III: CPT | Mod: PBBFAC,,, | Performed by: ORTHOPAEDIC SURGERY

## 2025-03-19 RX ORDER — MELOXICAM 15 MG/1
15 TABLET ORAL DAILY
Qty: 90 TABLET | Refills: 3 | Status: SHIPPED | OUTPATIENT
Start: 2025-03-19

## 2025-03-19 RX ADMIN — Medication 48 MG: at 09:03

## 2025-03-19 NOTE — PATIENT INSTRUCTIONS
Encounter Diagnosis   Name Primary?    Primary osteoarthritis of left knee Yes       ASSESSMENT:  Pain 4/10 left knee    TREATMENT/PLAN:  Synvisc-One Left knee intra-articular injection  RTO 6 months, for repeat viscosupplementation if current treatment effective   Rx Rolling walker with seat  Meloxicam 15 mg #90, 3 refills

## 2025-03-19 NOTE — TELEPHONE ENCOUNTER
Patient call has been returned regarding a walker order. Patient was notified PATTI Shah faxed the order and the walker will be ship to patient address on file.

## 2025-03-19 NOTE — PROCEDURES
Large Joint Aspiration/Injection: L knee    Date/Time: 3/19/2025 9:45 AM    Performed by: Paco Darling MD  Authorized by: Paco Darling MD    Consent Done?:  Yes (Verbal)  Indications:  Pain and arthritis  Timeout: prior to procedure the correct patient, procedure, and site was verified    Prep: patient was prepped and draped in usual sterile fashion      Local anesthesia used?: Yes    Anesthesia:  Local infiltration  Local anesthetic:  Topical anesthetic and bupivacaine 0.25% without epinephrine  Anesthetic total (ml):  3      Details:  Needle Size:  21 G  Ultrasonic Guidance for needle placement?: No    Approach:  Lateral  Location:  Knee  Site:  L knee  Medications:  48 mg hylan g-f 20 48 mg/6 mL  Patient tolerance:  Patient tolerated the procedure well with no immediate complications

## 2025-03-19 NOTE — TELEPHONE ENCOUNTER
----- Message from Jess sent at 3/19/2025 12:35 PM CDT -----  Contact: self  [phone]  Type:  Needs Medical Advice attn Uriel Called: DianeSymptoms (please be specific): walker with seatHow long has patient had these symptoms:Pharmacy name and phone #:Would the patient rather a call back or a response via [facility]?call back Best Call Back Number:  [phone] Information: patient called in this afternoon stating the walker could not be ordered through the pharmacy needs to be ordered through [facility]. Please call back  [phone]

## 2025-03-19 NOTE — PROGRESS NOTES
Paco Darling MD  Orthopedic Surgeon   [address] [place], [place] [address]     VISIT DATE: 3/19/2025      Name: Yaquelin Diaz  MRN: [id number]    PCP: Pretty, Primary Doctor  Insurance: Payor: MEDICARE / Plan: MEDICARE PART A & B / Product Type: Government /   Residence: [place]    Reason for Visit: Synvisc-One    Patient ID: Yaquelin Diaz is a 79 y.o. female.   Chief Complaint of Pain of the Left Knee (Pain 4/10/)        Patient Instructions     Encounter Diagnosis   Name Primary?    Primary osteoarthritis of left knee Yes       ASSESSMENT:  Pain 4/10 left knee    TREATMENT/PLAN:  Synvisc-One Left knee intra-articular injection  RTO 6 months, for repeat viscosupplementation if current treatment effective   Rx Rolling walker with seat  Meloxicam 15 mg #90, 3 refills    HPI:  Left knee pain.  No swelling      PHYSICAL EXAMINATION:  BMI: Body mass index is 28.02 kg/m².  NVI left leg  No effusion         Paco Darling M.D.  Orthopedic Surgeon  [facility]

## 2025-08-14 ENCOUNTER — TELEPHONE (OUTPATIENT)
Dept: ORTHOPEDICS | Facility: CLINIC | Age: 80
End: 2025-08-14
Payer: MEDICARE